# Patient Record
(demographics unavailable — no encounter records)

---

## 2024-10-14 NOTE — PHYSICAL EXAM
[FreeTextEntry1] : Pleasant, in no distress. Language: English HEENT: Head: no trauma. Eyes: no discharge. Ears: No discharge. Nose No discharge. Throat: clear Neck: FAROM. Negative Spurlings Heart: RR, +S1, S2 Lungs: CTA Abdomen: soft, NT Lumbar spine: FAROM, Mild spasm  LUE: Shoulder:FAROM, MS 5/5 Elbow: FAROM, MS 5/5 reflexes 2/4 Wrist: FAROM, MS 5/5 reflexes 2/4 Warm, nontender, pulse 2+  RUE:Shoulder:FAROM, MS 5/5 Elbow: FAROM, MS 5/5 reflexes 2/4 Wrist: FAROM, MS 5/5 reflexes 2/4 Warm, nontender, pulse 2+  LLE: Hip: FAROM, MS 4/5  Tender over the piriformis and lateral hip bursa.  She is also mildly tender over the Iliotibial band. Knee: FAROM, MS 5/5 reflexes 2/4 Ankle: FAROM, MS 5/5 reflexes 2/4 Warm , nontender, pulse 2+ negative homans  RLE: Hip: FAROM, MS 5/5 Knee: FAROM, MS 5/5 reflexes 2/4 Ankle: FAROM, MS 5/5 reflexes 2/4 Warm , nontender, pulse 2+ negative homans  Gait: Spontaneous, reciprocal, safe without an assistive device  Sensation RUE: sensation is intact to light touch, pinprick  and proprioception LUE: sensation is intact to light touch, pinprick  and proprioception RLE: sensation is intact to light touch, pinprick  and proprioception. Neg SLR. Neg JANNETH, Neg FADIR LLE: sensation is intact to light touch, pinprick  and proprioception. Neg SLR. Neg JANNETH,+FADIR

## 2024-10-14 NOTE — REVIEW OF SYSTEMS
[Joint Pain] : joint pain [Fever] : no fever [Eye Pain] : no eye pain [Earache] : no earache [Chest Pain] : no chest pain [Shortness Of Breath] : no shortness of breath [Abdominal Pain] : no abdominal pain [Dysuria] : no dysuria [Skin Wound] : no skin wound [Dizziness] : no dizziness [Insomnia] : no insomnia [Easy Bruising] : no tendency for easy bruising

## 2024-10-14 NOTE — HISTORY OF PRESENT ILLNESS
[FreeTextEntry1] : 36 year old Female presents with Left hip pain since July 2023.  She denies a fall.  She tried to self treat with over-the-counter pain medications and topical creams. She was last seen in the office on January 10, 2024.  Left hip pain Pain:  8/10 Worse: 10/10 Quality: sharp  Frequency: constant  Her hip has been progressing.  The pain starts over the left lateral hip.  The pain radiates to the buttock to the sacrum and then down the side of the leg to the knee.  She cannot lay on the left hip at night.  She has increased pain in the left hip when she stands more than 30 seconds.

## 2024-10-14 NOTE — DATA REVIEWED
[Other: ___] : [unfilled] [FreeTextEntry1] : Hemoglobin A1c is 5.1%.  She should be able to tolerate a steroid injection BUNs/CR 13/0.92.  Patient able to tolerate an NSAID

## 2024-12-30 NOTE — IMAGING
[de-identified] : General: NAD, A&Ox3 Gait: Mildly antalgic, +Trendelenburg Foot Progression: neutral Knee Progression: neutral  L Hip Exam: Pain with Log Roll - negative Flexion: 100 Pain with Hyperflexion - yes FADIR - pos JANNETH - neg Extension: 0 Flexion Contracture - none Ischial tenderness - none Trochanteric tenderness - pos   Abduction - 4 /5, pain - yes Adduction - 5 /5 Supine resisted SLR - 5 /5, pain - yes Dorsiflexion 5/5 Plantarflexion 5/5 EHL 5/5 DP/PT 2+, foot is warm and well perfused     Leg Lengths: symmetric

## 2024-12-30 NOTE — DATA REVIEWED
[Outside X-rays] : outside x-rays [MRI] : MRI [Left] : left [Hip] : hip [I independently reviewed and interpreted images and report] : I independently reviewed and interpreted images and report [FreeTextEntry2] : glut medius and minimus tendinitis and bursitis

## 2024-12-30 NOTE — IMAGING
[de-identified] : General: NAD, A&Ox3 Gait: Mildly antalgic, +Trendelenburg Foot Progression: neutral Knee Progression: neutral  L Hip Exam: Pain with Log Roll - negative Flexion: 100 Pain with Hyperflexion - yes FADIR - pos JANNETH - neg Extension: 0 Flexion Contracture - none Ischial tenderness - none Trochanteric tenderness - pos   Abduction - 4 /5, pain - yes Adduction - 5 /5 Supine resisted SLR - 5 /5, pain - yes Dorsiflexion 5/5 Plantarflexion 5/5 EHL 5/5 DP/PT 2+, foot is warm and well perfused     Leg Lengths: symmetric

## 2024-12-30 NOTE — DISCUSSION/SUMMARY
[de-identified] :  TREMAYNE  has L abductor tendinitis and trochanteric bursitis   - We discussed their diagnosis and treatment options at length - We will continue conservative treatment with activity modification, PT, icing, weight loss, and anti-inflammatory medications. - The patient was provided with a PT prescription to work on ROM, hip ER/abductors strengthening, quad/hamstring stretches and strengthening, and other exercises - The patient was advised to let pain guide the gradual advancement of activities. - We also discussed the possible of a corticosteroid injection in order to help decrease inflammation and pain so that they can perform better therapy.  This was performed today. - An anti-inflammatory med was called in today - F/u in 3 months for recheck      - We discussed a comprehensive treatment plan that included the use of prescription strength anti-inflammatory medications including but not limited to options such as oral Naprosyn 500mg BID, once daily Meloxicam 15 mg, or 500-650 mg Tylenol versus topical prescription NSAID Pennsaid vs over the counter Voltaren gel. There is a moderate risk of morbidity with further treatment, especially from use of prescription strength medications and possible side effects of these medications which include upset stomach with oral medications, skin reactions to topical medications and cardiac/renal issues with long term use. I recommended that the patient follow-up with their medical physician to discuss any significant specific potential issues with long term medication use such as interactions with current medications or with exacerbation of underlying medical comorbidities.   Prior to appointment and during encounter with patient extensive medical records were reviewed including but not limited to, hospital records, out patient records, imaging results, and lab data. During this appointment the patient was examined, diagnoses were discussed and explained in a face to face manner. In addition extensive time was spent reviewing aforementioned diagnostic studies. Counseling including abnormal image results, differential diagnoses, treatment options, risk and benefits, lifestyle changes, current condition, and current medications was performed. Patient's comments, questions, and concerns were address and patient verbalized understanding.

## 2024-12-30 NOTE — DISCUSSION/SUMMARY
[de-identified] :  TREMAYNE  has L abductor tendinitis and trochanteric bursitis   - We discussed their diagnosis and treatment options at length - We will continue conservative treatment with activity modification, PT, icing, weight loss, and anti-inflammatory medications. - The patient was provided with a PT prescription to work on ROM, hip ER/abductors strengthening, quad/hamstring stretches and strengthening, and other exercises - The patient was advised to let pain guide the gradual advancement of activities. - We also discussed the possible of a corticosteroid injection in order to help decrease inflammation and pain so that they can perform better therapy.  This was performed today. - An anti-inflammatory med was called in today - F/u in 3 months for recheck      - We discussed a comprehensive treatment plan that included the use of prescription strength anti-inflammatory medications including but not limited to options such as oral Naprosyn 500mg BID, once daily Meloxicam 15 mg, or 500-650 mg Tylenol versus topical prescription NSAID Pennsaid vs over the counter Voltaren gel. There is a moderate risk of morbidity with further treatment, especially from use of prescription strength medications and possible side effects of these medications which include upset stomach with oral medications, skin reactions to topical medications and cardiac/renal issues with long term use. I recommended that the patient follow-up with their medical physician to discuss any significant specific potential issues with long term medication use such as interactions with current medications or with exacerbation of underlying medical comorbidities.   Prior to appointment and during encounter with patient extensive medical records were reviewed including but not limited to, hospital records, out patient records, imaging results, and lab data. During this appointment the patient was examined, diagnoses were discussed and explained in a face to face manner. In addition extensive time was spent reviewing aforementioned diagnostic studies. Counseling including abnormal image results, differential diagnoses, treatment options, risk and benefits, lifestyle changes, current condition, and current medications was performed. Patient's comments, questions, and concerns were address and patient verbalized understanding.

## 2024-12-30 NOTE — PROCEDURE
[de-identified] : Ultrasound guidance was required to improve accuracy and the diagnostic and/or therapeutic value of the hip injection as well as to avoid critical neurovascular structures located in close proximity to the hip joint [FreeTextEntry1] : L GT US CSI [FreeTextEntry2] : pain [FreeTextEntry3] :  The patient was positioned supine on the exam table with the L lateral hip exposed. All risks and benefits were discussed with the patient including infection, bleeding, allergic reactions, worsening of symptoms, and possible neurovascular damage.  They stated understanding and were agreeable to proceed.   The skin overlying the injection site was sterilely prepped with ETOH and betadine.  The greater trochanter was localized with the ultrasound probe and the point of maximal tenderness was identified by applying gentle pressure through the probe.  A 20 gauge needle was inserted into the point of maximum tenderness along the lateral hip.  40mg Kenalog and 4cc of 0.5% Marcaine was then injected.  The needle was withdrawn and a band aid was placed.  The patient tolerated the procedure well without any apparent complications.  They were instructed to perform activities that would normally aggravate their hip and to take note of how much relief they get from the injection.  Counseled on concerning signs and symptoms including redness/swelling and to call the office with any problems.

## 2024-12-30 NOTE — PROCEDURE
[de-identified] : Ultrasound guidance was required to improve accuracy and the diagnostic and/or therapeutic value of the hip injection as well as to avoid critical neurovascular structures located in close proximity to the hip joint [FreeTextEntry1] : L GT US CSI [FreeTextEntry2] : pain [FreeTextEntry3] :  The patient was positioned supine on the exam table with the L lateral hip exposed. All risks and benefits were discussed with the patient including infection, bleeding, allergic reactions, worsening of symptoms, and possible neurovascular damage.  They stated understanding and were agreeable to proceed.   The skin overlying the injection site was sterilely prepped with ETOH and betadine.  The greater trochanter was localized with the ultrasound probe and the point of maximal tenderness was identified by applying gentle pressure through the probe.  A 20 gauge needle was inserted into the point of maximum tenderness along the lateral hip.  40mg Kenalog and 4cc of 0.5% Marcaine was then injected.  The needle was withdrawn and a band aid was placed.  The patient tolerated the procedure well without any apparent complications.  They were instructed to perform activities that would normally aggravate their hip and to take note of how much relief they get from the injection.  Counseled on concerning signs and symptoms including redness/swelling and to call the office with any problems.